# Patient Record
Sex: MALE | Race: BLACK OR AFRICAN AMERICAN | HISPANIC OR LATINO | URBAN - METROPOLITAN AREA
[De-identification: names, ages, dates, MRNs, and addresses within clinical notes are randomized per-mention and may not be internally consistent; named-entity substitution may affect disease eponyms.]

---

## 2020-10-29 ENCOUNTER — WEB ENCOUNTER (OUTPATIENT)
Dept: URBAN - METROPOLITAN AREA CLINIC 113 | Facility: CLINIC | Age: 40
End: 2020-10-29

## 2020-10-29 ENCOUNTER — OFFICE VISIT (OUTPATIENT)
Dept: URBAN - METROPOLITAN AREA CLINIC 113 | Facility: CLINIC | Age: 40
End: 2020-10-29
Payer: COMMERCIAL

## 2020-10-29 ENCOUNTER — DASHBOARD ENCOUNTERS (OUTPATIENT)
Age: 40
End: 2020-10-29

## 2020-10-29 VITALS
HEIGHT: 71 IN | DIASTOLIC BLOOD PRESSURE: 90 MMHG | TEMPERATURE: 97.1 F | BODY MASS INDEX: 42.28 KG/M2 | SYSTOLIC BLOOD PRESSURE: 137 MMHG | WEIGHT: 302 LBS | HEART RATE: 76 BPM

## 2020-10-29 DIAGNOSIS — K64.8 INTERNAL HEMORRHOIDS WITH BLEEDING: ICD-10-CM

## 2020-10-29 DIAGNOSIS — K92.1 RECTAL BLEEDING: ICD-10-CM

## 2020-10-29 DIAGNOSIS — R74.8 ELEVATED LIVER ENZYMES: ICD-10-CM

## 2020-10-29 PROBLEM — 12063002 RECTAL BLEEDING: Status: ACTIVE | Noted: 2020-10-29

## 2020-10-29 PROBLEM — 70153002 HEMORRHOIDS: Status: ACTIVE | Noted: 2020-10-29

## 2020-10-29 PROCEDURE — 99243 OFF/OP CNSLTJ NEW/EST LOW 30: CPT | Performed by: INTERNAL MEDICINE

## 2020-10-29 PROCEDURE — 99203 OFFICE O/P NEW LOW 30 MIN: CPT | Performed by: INTERNAL MEDICINE

## 2020-10-29 RX ORDER — WHEAT DEXTRIN 3 G/3.5 G
1 TABLESPOON POWDER (GRAM) ORAL DAILY
Qty: 90 | Refills: 1 | OUTPATIENT
Start: 2020-10-29 | End: 2021-04-27

## 2020-10-29 RX ORDER — POLYETHYLENE GLYCOL 3350, SODIUM CHLORIDE, SODIUM BICARBONATE AND POTASSIUM CHLORIDE 420G
AS DIRECTED KIT ORAL ONCE
Qty: 4000 ML | Refills: 0 | OUTPATIENT
Start: 2020-10-29 | End: 2020-10-30

## 2020-10-29 NOTE — HPI-TODAY'S VISIT:
Mr. Bryant is a 39 yo male with a hx of obesity and hypertension referred by Dr. Steel for rectal bleeding .  A copy of this document is being forwarded to the referring provider.   He has been having intermittent rectal bleeding and perianal itching over the last 2 months.  The bleeding subsided, but the ithcing has persisted.  He was given hemorrhoid cream which has helped.  The blood was bright red and mostily in the toilet water.  He denies straining or constipation.   He denies abdominal pain, nausea, vomiting, change in bowel habits, blood in the stool or weight loss.   No family history of colon cancer, inflammatory bowel disease GI cancers, peptic ulcer disease or chronic liver disease.   The patient denies any significant alcohol use, tobacco use or illicit drug use.

## 2020-11-02 ENCOUNTER — OFFICE VISIT (OUTPATIENT)
Dept: URBAN - METROPOLITAN AREA SURGERY CENTER 25 | Facility: SURGERY CENTER | Age: 40
End: 2020-11-02

## 2020-11-02 RX ORDER — WHEAT DEXTRIN 3 G/3.5 G
1 TABLESPOON POWDER (GRAM) ORAL DAILY
Qty: 90 | Refills: 1 | Status: ACTIVE | COMMUNITY
Start: 2020-10-29 | End: 2021-04-27

## 2021-01-05 ENCOUNTER — OFFICE VISIT (OUTPATIENT)
Dept: URBAN - METROPOLITAN AREA CLINIC 113 | Facility: CLINIC | Age: 41
End: 2021-01-05

## 2021-01-05 RX ORDER — WHEAT DEXTRIN 3 G/3.5 G
1 TABLESPOON POWDER (GRAM) ORAL DAILY
Qty: 90 | Refills: 1 | Status: ACTIVE | COMMUNITY
Start: 2020-10-29 | End: 2021-04-27

## 2021-06-24 ENCOUNTER — TELEPHONE ENCOUNTER (OUTPATIENT)
Dept: URBAN - METROPOLITAN AREA CLINIC 113 | Facility: CLINIC | Age: 41
End: 2021-06-24